# Patient Record
Sex: MALE | URBAN - METROPOLITAN AREA
[De-identification: names, ages, dates, MRNs, and addresses within clinical notes are randomized per-mention and may not be internally consistent; named-entity substitution may affect disease eponyms.]

---

## 2019-06-10 ENCOUNTER — TELEMEDICINE2 (OUTPATIENT)
Dept: PULMONOLOGY | Facility: HOSPICE | Age: 64
End: 2019-06-10
Payer: COMMERCIAL

## 2019-06-10 VITALS
OXYGEN SATURATION: 96 % | DIASTOLIC BLOOD PRESSURE: 78 MMHG | BODY MASS INDEX: 30.8 KG/M2 | RESPIRATION RATE: 16 BRPM | HEART RATE: 93 BPM | SYSTOLIC BLOOD PRESSURE: 119 MMHG | WEIGHT: 220 LBS | HEIGHT: 71 IN | TEMPERATURE: 98.1 F

## 2019-06-10 DIAGNOSIS — J98.11 ATELECTASIS: ICD-10-CM

## 2019-06-10 PROCEDURE — 99203 OFFICE O/P NEW LOW 30 MIN: CPT | Mod: GT | Performed by: INTERNAL MEDICINE

## 2019-06-10 RX ORDER — ATORVASTATIN CALCIUM 40 MG/1
40 TABLET, FILM COATED ORAL NIGHTLY
COMMUNITY

## 2019-06-10 RX ORDER — HYDROCHLOROTHIAZIDE 12.5 MG/1
12.5 CAPSULE, GELATIN COATED ORAL DAILY
COMMUNITY

## 2019-06-10 ASSESSMENT — ENCOUNTER SYMPTOMS
DIARRHEA: 0
SINUS PAIN: 0
DOUBLE VISION: 0
COUGH: 0
ABDOMINAL PAIN: 0
HEARTBURN: 0
INSOMNIA: 0
ORTHOPNEA: 0
WHEEZING: 0
NERVOUS/ANXIOUS: 0
CHILLS: 0
PALPITATIONS: 0
HEADACHES: 0
CONSTIPATION: 0
SPUTUM PRODUCTION: 0
FEVER: 0
NAUSEA: 0
STRIDOR: 0
SORE THROAT: 0
MEMORY LOSS: 0
WEIGHT LOSS: 0
VOMITING: 0
TREMORS: 0
BRUISES/BLEEDS EASILY: 0
SPEECH CHANGE: 0
SENSORY CHANGE: 0
HEMOPTYSIS: 0
BLURRED VISION: 0
WEAKNESS: 0
NECK PAIN: 0
SEIZURES: 0
DIZZINESS: 0
BACK PAIN: 0
PHOTOPHOBIA: 0
SHORTNESS OF BREATH: 0

## 2019-06-10 NOTE — PROGRESS NOTES
Subjective:      Jared Layton is a 63 y.o. male who presents with bibasilar atelectasis incidental findings on CXR 3/2019          HPI  Patient 63 years old male with past medical history of obesity, hypertension and hyperlipidemia.  He has no respiratory complaints.  He resides in a correctional facility and had a positive PPD earlier this year.  In March 2019, he underwent a screening chest x-ray for positive PPD and was found to have bibasilar atelectasis.  A CT chest was ordered, and in April 2019, we reviewed the report and no atelectasis was identified.  He had contrast and no PE was identified either, no infiltrate, mass or effusion.  Patient has no cough, dyspnea, or sputum production.  He denies fever, weight loss, or hemoptysis.  He has no tuberculosis contacts.  He has lost around only 3 or 4 pounds intentionally with exercise in the last 6 months.  He was never told that he has a pulmonary abnormality on x-ray in the past.    Review of Systems   Constitutional: Negative for chills, fever, malaise/fatigue and weight loss.   HENT: Negative for congestion, hearing loss, sinus pain and sore throat.    Eyes: Negative for blurred vision, double vision and photophobia.   Respiratory: Negative for cough, hemoptysis, sputum production, shortness of breath, wheezing and stridor.    Cardiovascular: Negative for chest pain, palpitations and orthopnea.   Gastrointestinal: Negative for abdominal pain, constipation, diarrhea, heartburn, nausea and vomiting.   Musculoskeletal: Negative for back pain, joint pain and neck pain.   Skin: Negative for itching and rash.   Neurological: Negative for dizziness, tremors, sensory change, speech change, seizures, weakness and headaches.   Endo/Heme/Allergies: Negative for environmental allergies. Does not bruise/bleed easily.   Psychiatric/Behavioral: Negative for memory loss. The patient is not nervous/anxious and does not have insomnia.      PMH HTN, HPL, obesity   History  "reviewed. No pertinent surgical history.  Social History     Social History   • Marital status: Unknown     Spouse name: N/A   • Number of children: N/A   • Years of education: N/A     Occupational History   • Not on file.     Social History Main Topics   • Smoking status: Never Smoker   • Smokeless tobacco: Never Used   • Alcohol use No   • Drug use: No   • Sexual activity: Not on file     Other Topics Concern   • Not on file     Social History Narrative   • No narrative on file     History reviewed. No pertinent family history.       Objective:     /78   Pulse 93   Temp 36.7 °C (98.1 °F)   Resp 16   Ht 1.803 m (5' 11\")   Wt 99.8 kg (220 lb)   SpO2 96%   BMI 30.68 kg/m²      Physical Exam   Constitutional: He is oriented to person, place, and time. He appears well-developed and well-nourished. No distress.   HENT:   Head: Normocephalic and atraumatic.   Neck: Normal range of motion. Neck supple. No JVD present. No tracheal deviation present. No thyromegaly present.   Cardiovascular: Normal rate, regular rhythm, normal heart sounds and intact distal pulses.  Exam reveals no gallop and no friction rub.    No murmur heard.  Pulmonary/Chest: Effort normal. No stridor. No respiratory distress. He has no wheezes. He has no rales. He exhibits no tenderness.   Abdominal: Soft. He exhibits no distension. There is no tenderness.   Musculoskeletal: He exhibits no edema, tenderness or deformity.   Lymphadenopathy:     He has no cervical adenopathy.   Neurological: He is alert and oriented to person, place, and time.   Skin: Skin is warm. No rash noted. He is not diaphoretic. No erythema. No pallor.   Psychiatric: He has a normal mood and affect. His behavior is normal. Judgment normal.   Nursing note and vitals reviewed.         CXR 3/2019 [facility]   Bibasilar atelectasis    CTA chest 4/2019  No PE, mass, infiltrate, effusion or atelectasis          Assessment/Plan:     1. Bibasilar atelectasis- resolved on " repeat imaging  Likely secondary to obesity  Asymptomatic  Educated about weight loss    2. Latent TB  Latent TB management per correctional facility protocols, if not treated yet, I would suggest INH + rifapentin weekly regimen for 12 weeks    3. Obesity  Weight loss      No further pulmonary f/u needed